# Patient Record
Sex: MALE | Race: WHITE | NOT HISPANIC OR LATINO | URBAN - METROPOLITAN AREA
[De-identification: names, ages, dates, MRNs, and addresses within clinical notes are randomized per-mention and may not be internally consistent; named-entity substitution may affect disease eponyms.]

---

## 2019-10-18 ENCOUNTER — EMERGENCY (EMERGENCY)
Facility: HOSPITAL | Age: 54
LOS: 1 days | Discharge: HOME | End: 2019-10-18
Attending: EMERGENCY MEDICINE | Admitting: EMERGENCY MEDICINE
Payer: COMMERCIAL

## 2019-10-18 VITALS
DIASTOLIC BLOOD PRESSURE: 70 MMHG | OXYGEN SATURATION: 98 % | SYSTOLIC BLOOD PRESSURE: 106 MMHG | TEMPERATURE: 98 F | RESPIRATION RATE: 16 BRPM | HEART RATE: 69 BPM

## 2019-10-18 VITALS
OXYGEN SATURATION: 95 % | TEMPERATURE: 98 F | DIASTOLIC BLOOD PRESSURE: 84 MMHG | RESPIRATION RATE: 18 BRPM | WEIGHT: 214.95 LBS | HEART RATE: 84 BPM | SYSTOLIC BLOOD PRESSURE: 120 MMHG

## 2019-10-18 LAB
APPEARANCE UR: CLEAR — SIGNIFICANT CHANGE UP
BASOPHILS # BLD AUTO: 0.09 K/UL — SIGNIFICANT CHANGE UP (ref 0–0.2)
BASOPHILS NFR BLD AUTO: 0.5 % — SIGNIFICANT CHANGE UP (ref 0–2)
BILIRUB UR-MCNC: NEGATIVE — SIGNIFICANT CHANGE UP
COLOR SPEC: YELLOW — SIGNIFICANT CHANGE UP
DIFF PNL FLD: ABNORMAL
EOSINOPHIL # BLD AUTO: 0.07 K/UL — SIGNIFICANT CHANGE UP (ref 0–0.5)
EOSINOPHIL NFR BLD AUTO: 0.4 % — SIGNIFICANT CHANGE UP (ref 0–6)
GLUCOSE UR QL: NEGATIVE — SIGNIFICANT CHANGE UP
HCT VFR BLD CALC: 47.6 % — SIGNIFICANT CHANGE UP (ref 39–50)
HGB BLD-MCNC: 16.2 G/DL — SIGNIFICANT CHANGE UP (ref 13–17)
IMM GRANULOCYTES NFR BLD AUTO: 0.5 % — SIGNIFICANT CHANGE UP (ref 0–1.5)
KETONES UR-MCNC: NEGATIVE — SIGNIFICANT CHANGE UP
LEUKOCYTE ESTERASE UR-ACNC: NEGATIVE — SIGNIFICANT CHANGE UP
LYMPHOCYTES # BLD AUTO: 16.2 % — SIGNIFICANT CHANGE UP (ref 13–44)
LYMPHOCYTES # BLD AUTO: 2.84 K/UL — SIGNIFICANT CHANGE UP (ref 1–3.3)
MCHC RBC-ENTMCNC: 30.7 PG — SIGNIFICANT CHANGE UP (ref 27–34)
MCHC RBC-ENTMCNC: 34 GM/DL — SIGNIFICANT CHANGE UP (ref 32–36)
MCV RBC AUTO: 90.2 FL — SIGNIFICANT CHANGE UP (ref 80–100)
MONOCYTES # BLD AUTO: 1.23 K/UL — HIGH (ref 0–0.9)
MONOCYTES NFR BLD AUTO: 7 % — SIGNIFICANT CHANGE UP (ref 2–14)
NEUTROPHILS # BLD AUTO: 13.2 K/UL — HIGH (ref 1.8–7.4)
NEUTROPHILS NFR BLD AUTO: 75.4 % — SIGNIFICANT CHANGE UP (ref 43–77)
NITRITE UR-MCNC: NEGATIVE — SIGNIFICANT CHANGE UP
NRBC # BLD: 0 /100 WBCS — SIGNIFICANT CHANGE UP (ref 0–0)
PH UR: 7 — SIGNIFICANT CHANGE UP (ref 5–8)
PLATELET # BLD AUTO: 219 K/UL — SIGNIFICANT CHANGE UP (ref 150–400)
PROT UR-MCNC: NEGATIVE — SIGNIFICANT CHANGE UP
RBC # BLD: 5.28 M/UL — SIGNIFICANT CHANGE UP (ref 4.2–5.8)
RBC # FLD: 12.7 % — SIGNIFICANT CHANGE UP (ref 10.3–14.5)
SP GR SPEC: 1 — LOW (ref 1.01–1.02)
UROBILINOGEN FLD QL: NEGATIVE — SIGNIFICANT CHANGE UP
WBC # BLD: 17.51 K/UL — HIGH (ref 3.8–10.5)
WBC # FLD AUTO: 17.51 K/UL — HIGH (ref 3.8–10.5)

## 2019-10-18 PROCEDURE — 99284 EMERGENCY DEPT VISIT MOD MDM: CPT | Mod: 25

## 2019-10-18 PROCEDURE — 74176 CT ABD & PELVIS W/O CONTRAST: CPT

## 2019-10-18 PROCEDURE — 99284 EMERGENCY DEPT VISIT MOD MDM: CPT

## 2019-10-18 PROCEDURE — 74176 CT ABD & PELVIS W/O CONTRAST: CPT | Mod: 26

## 2019-10-18 PROCEDURE — 96361 HYDRATE IV INFUSION ADD-ON: CPT

## 2019-10-18 PROCEDURE — 83690 ASSAY OF LIPASE: CPT

## 2019-10-18 PROCEDURE — 36415 COLL VENOUS BLD VENIPUNCTURE: CPT

## 2019-10-18 PROCEDURE — 96375 TX/PRO/DX INJ NEW DRUG ADDON: CPT

## 2019-10-18 PROCEDURE — 81001 URINALYSIS AUTO W/SCOPE: CPT

## 2019-10-18 PROCEDURE — 80053 COMPREHEN METABOLIC PANEL: CPT

## 2019-10-18 PROCEDURE — 85027 COMPLETE CBC AUTOMATED: CPT

## 2019-10-18 PROCEDURE — 96374 THER/PROPH/DIAG INJ IV PUSH: CPT

## 2019-10-18 RX ORDER — PIPERACILLIN AND TAZOBACTAM 4; .5 G/20ML; G/20ML
3.38 INJECTION, POWDER, LYOPHILIZED, FOR SOLUTION INTRAVENOUS ONCE
Refills: 0 | Status: COMPLETED | OUTPATIENT
Start: 2019-10-18 | End: 2019-10-18

## 2019-10-18 RX ORDER — SODIUM CHLORIDE 9 MG/ML
1000 INJECTION INTRAMUSCULAR; INTRAVENOUS; SUBCUTANEOUS ONCE
Refills: 0 | Status: COMPLETED | OUTPATIENT
Start: 2019-10-18 | End: 2019-10-18

## 2019-10-18 RX ORDER — KETOROLAC TROMETHAMINE 30 MG/ML
30 SYRINGE (ML) INJECTION ONCE
Refills: 0 | Status: DISCONTINUED | OUTPATIENT
Start: 2019-10-18 | End: 2019-10-18

## 2019-10-18 RX ORDER — METRONIDAZOLE 500 MG
500 TABLET ORAL ONCE
Refills: 0 | Status: COMPLETED | OUTPATIENT
Start: 2019-10-18 | End: 2019-10-18

## 2019-10-18 RX ORDER — METRONIDAZOLE 500 MG
1 TABLET ORAL
Qty: 30 | Refills: 0
Start: 2019-10-18 | End: 2019-10-27

## 2019-10-18 RX ADMIN — SODIUM CHLORIDE 1000 MILLILITER(S): 9 INJECTION INTRAMUSCULAR; INTRAVENOUS; SUBCUTANEOUS at 10:58

## 2019-10-18 RX ADMIN — SODIUM CHLORIDE 1000 MILLILITER(S): 9 INJECTION INTRAMUSCULAR; INTRAVENOUS; SUBCUTANEOUS at 09:58

## 2019-10-18 RX ADMIN — Medication 30 MILLIGRAM(S): at 10:08

## 2019-10-18 RX ADMIN — Medication 100 MILLIGRAM(S): at 13:09

## 2019-10-18 RX ADMIN — PIPERACILLIN AND TAZOBACTAM 3.38 GRAM(S): 4; .5 INJECTION, POWDER, LYOPHILIZED, FOR SOLUTION INTRAVENOUS at 09:47

## 2019-10-18 RX ADMIN — Medication 30 MILLIGRAM(S): at 09:58

## 2019-10-18 RX ADMIN — PIPERACILLIN AND TAZOBACTAM 200 GRAM(S): 4; .5 INJECTION, POWDER, LYOPHILIZED, FOR SOLUTION INTRAVENOUS at 12:17

## 2019-10-18 NOTE — ED PROVIDER NOTE - CARE PLAN
Principal Discharge DX:	Flank pain, acute Principal Discharge DX:	Diverticulitis of large intestine without perforation or abscess without bleeding

## 2019-10-18 NOTE — ED PROVIDER NOTE - CARE PROVIDER_API CALL
Diomedes Espinal ()  Internal Medicine  [address] [place] [address]  Phone: [phone]  Fax: [phone]  Follow Up Time:

## 2019-10-18 NOTE — ED ADULT NURSE NOTE - OBJECTIVE STATEMENT
Received pt in bed alert and oriented x4.  C/O left flank pain since yesterday.  Oly placed and blood work sent to lab.  Pt denies n/v/d.  Ongoing nursing care and safety maintained. Received pt in bed alert and oriented x4.  C/O left flank pain since yesterday.  Oly placed and blood work sent to lab.  Pt denies n/v/d.  Ct sent for CT.   Ongoing nursing care and safety maintained.

## 2019-10-18 NOTE — ED PROVIDER NOTE - OBJECTIVE STATEMENT
55 yo male hx of IBS c/o b/l flank pain that started yesterday with some associated chills, but now having pain only on left flank, nonradiating, spasm-like, no medication taken, moderate to severe, now subsiding.  No nausea/vomiting/diarrhea.   No fever.

## 2019-10-18 NOTE — ED PROVIDER NOTE - PATIENT PORTAL LINK FT
You can access the FollowMyHealth Patient Portal offered by [facility] by registering at the following website: [email or web address]. By joining [employer]’s FollowMyHealth portal, you will also be able to view your health information using other applications (apps) compatible with our system.

## 2019-10-18 NOTE — ED PROVIDER NOTE - NSFOLLOWUPINSTRUCTIONS_ED_ALL_ED_FT
1) Follow-up with your gastroenterologist or Dr. Espinal. Call today / next business day for prompt follow-up.  2) Return to Emergency room for any worsening or persistent pain, weakness, fever, or any other concerning symptoms.  3) See attached instruction sheets for additional information, including information regarding signs and symptoms to look out for, reasons to seek immediate care and other important instructions.  4) Augmentin 875mg every 12 hours for 10 days.  5) Flagyl 500mg every 8 hours for 10 days.

## 2019-10-18 NOTE — ED PROVIDER NOTE - NS ED ROS FT
Constitutional: - Fever, + Chills, - Anorexia, - Fatigue, - Night sweats  Eyes: - Discharge, - Irritation, - Redness, - Visual changes, - Light sensitivity, - Pain  EARS: - Ear Pain, - Tinnitus, - Decreased hearing  NOSE: - Congestion, - Bloody nose  MOUTH/THROAT: - Vocal Changes, - Drooling, - Sore throat  NECK: - Lumps, - Stiffness, - Pain  CV: - Palpitations, - Chest Pain, - Edema, - Syncope  RESP:  - Cough, - Shortness of Breath, - Dyspnea on Exertion, - Trouble speaking, - Pleuritic pain - Wheezing  GI: - Diarrhea, - Constipation, - Bloody stools, - Nausea, - Vomiting, - Abdominal Pain  : - Dysuria, -Frequency, - Hematuria, - Hesitancy, - Incontinence, - Saddle Anesthesia, - Abnormal discharge  MSK: - Myalgias, - Arthralgias, - Weakness, - Deformities, +left flank pain  SKIN: - Color change, - Rash, - Swelling, - Ecchymosis, - Abrasion, - laceration  NEURO: - Change in behavior, - Dec. Alertness, - Headache, - Dizziness, - Change in speech, - Weakness, - Seizure-like activity, - Difficulty ambulating

## 2019-10-18 NOTE — ED PROVIDER NOTE - PROGRESS NOTE DETAILS
labs and imaging explained, feels better, tolerating PO, will f/u with outpatient GI, offered admission, but patient declined, wants to try oral abx

## 2020-10-16 PROBLEM — K58.9 IRRITABLE BOWEL SYNDROME WITHOUT DIARRHEA: Chronic | Status: ACTIVE | Noted: 2019-10-18

## 2020-10-16 PROBLEM — Z00.00 ENCOUNTER FOR PREVENTIVE HEALTH EXAMINATION: Status: ACTIVE | Noted: 2020-10-16

## 2020-10-19 ENCOUNTER — RECORD ABSTRACTING (OUTPATIENT)
Age: 55
End: 2020-10-19

## 2020-10-19 ENCOUNTER — APPOINTMENT (OUTPATIENT)
Dept: OTOLARYNGOLOGY | Facility: CLINIC | Age: 55
End: 2020-10-19
Payer: COMMERCIAL

## 2020-10-19 VITALS
HEIGHT: 72 IN | DIASTOLIC BLOOD PRESSURE: 87 MMHG | WEIGHT: 215 LBS | SYSTOLIC BLOOD PRESSURE: 147 MMHG | TEMPERATURE: 97.1 F | BODY MASS INDEX: 29.12 KG/M2

## 2020-10-19 DIAGNOSIS — Z87.09 PERSONAL HISTORY OF OTHER DISEASES OF THE RESPIRATORY SYSTEM: ICD-10-CM

## 2020-10-19 DIAGNOSIS — Z78.9 OTHER SPECIFIED HEALTH STATUS: ICD-10-CM

## 2020-10-19 DIAGNOSIS — R22.0 LOCALIZED SWELLING, MASS AND LUMP, HEAD: ICD-10-CM

## 2020-10-19 DIAGNOSIS — H60.8X9 OTHER OTITIS EXTERNA, UNSPECIFIED EAR: ICD-10-CM

## 2020-10-19 DIAGNOSIS — R22.1 LOCALIZED SWELLING, MASS AND LUMP, HEAD: ICD-10-CM

## 2020-10-19 DIAGNOSIS — Z86.69 PERSONAL HISTORY OF OTHER DISEASES OF THE NERVOUS SYSTEM AND SENSE ORGANS: ICD-10-CM

## 2020-10-19 PROCEDURE — 99213 OFFICE O/P EST LOW 20 MIN: CPT | Mod: 25

## 2020-10-19 PROCEDURE — 99072 ADDL SUPL MATRL&STAF TM PHE: CPT

## 2020-10-19 PROCEDURE — 69210 REMOVE IMPACTED EAR WAX UNI: CPT

## 2020-10-19 NOTE — PHYSICAL EXAM
[de-identified] : maciel skin, CI AU [Normal] : lingual tonsils are normal [Midline] : trachea located in midline position

## 2020-10-19 NOTE — REVIEW OF SYSTEMS
[Ear Itch] : ear itch [Ear Pain] : ear pain [As Noted in HPI] : as noted in HPI [Negative] : Head and Neck

## 2020-10-19 NOTE — HISTORY OF PRESENT ILLNESS
[de-identified] : 55 yr old male c/o clogged ears. +hx CI\par -otalgia, tinnitus, dizzy\par Denies Qtips\par -hx otitis, noise exp\par +FH fatther NIHL\par +head trauma (soccer, no documented LOC)

## 2021-04-02 ENCOUNTER — APPOINTMENT (OUTPATIENT)
Dept: OTOLARYNGOLOGY | Facility: CLINIC | Age: 56
End: 2021-04-02
Payer: COMMERCIAL

## 2021-04-02 VITALS
SYSTOLIC BLOOD PRESSURE: 134 MMHG | WEIGHT: 220 LBS | HEART RATE: 99 BPM | TEMPERATURE: 97.4 F | HEIGHT: 72 IN | DIASTOLIC BLOOD PRESSURE: 87 MMHG | BODY MASS INDEX: 29.8 KG/M2

## 2021-04-02 DIAGNOSIS — H60.393 OTHER INFECTIVE OTITIS EXTERNA, BILATERAL: ICD-10-CM

## 2021-04-02 DIAGNOSIS — H61.22 IMPACTED CERUMEN, LEFT EAR: ICD-10-CM

## 2021-04-02 PROCEDURE — 99072 ADDL SUPL MATRL&STAF TM PHE: CPT

## 2021-04-02 PROCEDURE — 99213 OFFICE O/P EST LOW 20 MIN: CPT | Mod: 25

## 2021-04-02 PROCEDURE — 69210 REMOVE IMPACTED EAR WAX UNI: CPT

## 2021-04-02 RX ORDER — USTEKINUMAB 90 MG/ML
90 INJECTION, SOLUTION SUBCUTANEOUS
Refills: 0 | Status: ACTIVE | COMMUNITY

## 2021-04-02 NOTE — ASSESSMENT
[FreeTextEntry1] : Reviewed and reconciled medications, allergies, PMHx, PSHx, SocHx, FMHx.\par \par h/o chronic bilateral OE\par left cerumen impaction\par \par Plan:\par Cerumen removed. FU 4 months.

## 2021-04-02 NOTE — HISTORY OF PRESENT ILLNESS
[de-identified] : The patient presents with h/o chronic bilateral OE. Pt presents today with ear clogging.

## 2021-04-02 NOTE — ADDENDUM
[FreeTextEntry1] : Documented by Moira Ling acting as scribe for Dr. Grande on 04/02/2021.\par \par All Medical record entries made by the Scribe were at my, Dr. Grande, direction and personally dictated by me on 04/02/2021 . I have reviewed the chart and agree that the record accurately reflects my personal performance of the history, physical exam, assessment and plan. I have also personally directed, reviewed, and agreed with the discharge instructions.

## 2021-04-02 NOTE — PHYSICAL EXAM
[Hearing Kothari Test (Tuning Fork On Forehead)] : no lateralization of tone [] : septum deviated to the right [Midline] : trachea located in midline position [Normal] : no rashes [FreeTextEntry8] : cerumen removed via suction and curettage [FreeTextEntry9] : cerumen impaction removed via suction and curettage [FreeTextEntry2] : sinuses nontender to percussion

## 2021-09-30 ENCOUNTER — APPOINTMENT (OUTPATIENT)
Dept: OTOLARYNGOLOGY | Facility: CLINIC | Age: 56
End: 2021-09-30
Payer: COMMERCIAL

## 2021-09-30 VITALS
SYSTOLIC BLOOD PRESSURE: 128 MMHG | HEART RATE: 71 BPM | BODY MASS INDEX: 30.34 KG/M2 | WEIGHT: 224 LBS | HEIGHT: 72 IN | DIASTOLIC BLOOD PRESSURE: 84 MMHG

## 2021-09-30 PROCEDURE — 69210 REMOVE IMPACTED EAR WAX UNI: CPT

## 2021-09-30 PROCEDURE — 99213 OFFICE O/P EST LOW 20 MIN: CPT | Mod: 25

## 2021-09-30 NOTE — REVIEW OF SYSTEMS
[As Noted in HPI] : as noted in HPI [Ear Pain] : ear pain [Ear Itch] : ear itch [Negative] : Head and Neck

## 2021-09-30 NOTE — PHYSICAL EXAM
[de-identified] : dry skin w CI AU [] : septum deviated to the right [Normal] : mucosa is normal [Midline] : trachea located in midline position

## 2021-09-30 NOTE — HISTORY OF PRESENT ILLNESS
[de-identified] : 56 yr old male c/o clogged ears. +hx CI\par -otalgia, tinnitus, dizzy\par Denies Qtips\par -hx otitis, noise exp\par +FH fatther NIHL\par +head trauma (soccer, no documented LOC)

## 2022-03-17 ENCOUNTER — APPOINTMENT (OUTPATIENT)
Dept: OTOLARYNGOLOGY | Facility: CLINIC | Age: 57
End: 2022-03-17
Payer: COMMERCIAL

## 2022-03-17 VITALS
SYSTOLIC BLOOD PRESSURE: 101 MMHG | DIASTOLIC BLOOD PRESSURE: 67 MMHG | HEART RATE: 90 BPM | WEIGHT: 220 LBS | HEIGHT: 72 IN | BODY MASS INDEX: 29.8 KG/M2

## 2022-03-17 PROCEDURE — 99213 OFFICE O/P EST LOW 20 MIN: CPT | Mod: 25

## 2022-03-17 PROCEDURE — 69210 REMOVE IMPACTED EAR WAX UNI: CPT

## 2022-03-17 NOTE — PHYSICAL EXAM
[de-identified] :  CI AU [] : septum deviated to the right [Normal] : mucosa is normal [Midline] : trachea located in midline position

## 2022-03-21 ENCOUNTER — EMERGENCY (EMERGENCY)
Facility: HOSPITAL | Age: 57
LOS: 1 days | Discharge: HOME | End: 2022-03-21
Attending: EMERGENCY MEDICINE | Admitting: EMERGENCY MEDICINE
Payer: COMMERCIAL

## 2022-03-21 VITALS
SYSTOLIC BLOOD PRESSURE: 125 MMHG | WEIGHT: 220.02 LBS | RESPIRATION RATE: 18 BRPM | TEMPERATURE: 98 F | HEIGHT: 72 IN | HEART RATE: 77 BPM | DIASTOLIC BLOOD PRESSURE: 85 MMHG | OXYGEN SATURATION: 98 %

## 2022-03-21 VITALS
HEART RATE: 63 BPM | DIASTOLIC BLOOD PRESSURE: 79 MMHG | OXYGEN SATURATION: 97 % | RESPIRATION RATE: 16 BRPM | SYSTOLIC BLOOD PRESSURE: 123 MMHG | TEMPERATURE: 98 F

## 2022-03-21 LAB
ALBUMIN SERPL ELPH-MCNC: 4 G/DL — SIGNIFICANT CHANGE UP (ref 3.3–5)
ALP SERPL-CCNC: 91 U/L — SIGNIFICANT CHANGE UP (ref 40–120)
ALT FLD-CCNC: 49 U/L — SIGNIFICANT CHANGE UP (ref 12–78)
ANION GAP SERPL CALC-SCNC: 5 MMOL/L — SIGNIFICANT CHANGE UP (ref 5–17)
APPEARANCE UR: CLEAR — SIGNIFICANT CHANGE UP
AST SERPL-CCNC: 24 U/L — SIGNIFICANT CHANGE UP (ref 15–37)
BASOPHILS # BLD AUTO: 0.09 K/UL — SIGNIFICANT CHANGE UP (ref 0–0.2)
BASOPHILS NFR BLD AUTO: 0.6 % — SIGNIFICANT CHANGE UP (ref 0–2)
BILIRUB SERPL-MCNC: 0.7 MG/DL — SIGNIFICANT CHANGE UP (ref 0.2–1.2)
BILIRUB UR-MCNC: NEGATIVE — SIGNIFICANT CHANGE UP
BUN SERPL-MCNC: 14 MG/DL — SIGNIFICANT CHANGE UP (ref 7–23)
CALCIUM SERPL-MCNC: 9.6 MG/DL — SIGNIFICANT CHANGE UP (ref 8.5–10.1)
CHLORIDE SERPL-SCNC: 106 MMOL/L — SIGNIFICANT CHANGE UP (ref 96–108)
CO2 SERPL-SCNC: 27 MMOL/L — SIGNIFICANT CHANGE UP (ref 22–31)
COLOR SPEC: SIGNIFICANT CHANGE UP
CREAT SERPL-MCNC: 0.93 MG/DL — SIGNIFICANT CHANGE UP (ref 0.5–1.3)
DIFF PNL FLD: NEGATIVE — SIGNIFICANT CHANGE UP
EGFR: 96 ML/MIN/1.73M2 — SIGNIFICANT CHANGE UP
EOSINOPHIL # BLD AUTO: 0.18 K/UL — SIGNIFICANT CHANGE UP (ref 0–0.5)
EOSINOPHIL NFR BLD AUTO: 1.3 % — SIGNIFICANT CHANGE UP (ref 0–6)
GLUCOSE SERPL-MCNC: 110 MG/DL — HIGH (ref 70–99)
GLUCOSE UR QL: NEGATIVE — SIGNIFICANT CHANGE UP
HCT VFR BLD CALC: 46.6 % — SIGNIFICANT CHANGE UP (ref 39–50)
HGB BLD-MCNC: 16.1 G/DL — SIGNIFICANT CHANGE UP (ref 13–17)
IMM GRANULOCYTES NFR BLD AUTO: 0.8 % — SIGNIFICANT CHANGE UP (ref 0–1.5)
KETONES UR-MCNC: NEGATIVE — SIGNIFICANT CHANGE UP
LEUKOCYTE ESTERASE UR-ACNC: NEGATIVE — SIGNIFICANT CHANGE UP
LIDOCAIN IGE QN: 88 U/L — SIGNIFICANT CHANGE UP (ref 73–393)
LYMPHOCYTES # BLD AUTO: 26.5 % — SIGNIFICANT CHANGE UP (ref 13–44)
LYMPHOCYTES # BLD AUTO: 3.76 K/UL — HIGH (ref 1–3.3)
MCHC RBC-ENTMCNC: 30.8 PG — SIGNIFICANT CHANGE UP (ref 27–34)
MCHC RBC-ENTMCNC: 34.5 GM/DL — SIGNIFICANT CHANGE UP (ref 32–36)
MCV RBC AUTO: 89.3 FL — SIGNIFICANT CHANGE UP (ref 80–100)
MONOCYTES # BLD AUTO: 0.84 K/UL — SIGNIFICANT CHANGE UP (ref 0–0.9)
MONOCYTES NFR BLD AUTO: 5.9 % — SIGNIFICANT CHANGE UP (ref 2–14)
NEUTROPHILS # BLD AUTO: 9.22 K/UL — HIGH (ref 1.8–7.4)
NEUTROPHILS NFR BLD AUTO: 64.9 % — SIGNIFICANT CHANGE UP (ref 43–77)
NITRITE UR-MCNC: NEGATIVE — SIGNIFICANT CHANGE UP
NRBC # BLD: 0 /100 WBCS — SIGNIFICANT CHANGE UP (ref 0–0)
PH UR: 6 — SIGNIFICANT CHANGE UP (ref 5–8)
PLATELET # BLD AUTO: 227 K/UL — SIGNIFICANT CHANGE UP (ref 150–400)
POTASSIUM SERPL-MCNC: 4.3 MMOL/L — SIGNIFICANT CHANGE UP (ref 3.5–5.3)
POTASSIUM SERPL-SCNC: 4.3 MMOL/L — SIGNIFICANT CHANGE UP (ref 3.5–5.3)
PROT SERPL-MCNC: 7.7 G/DL — SIGNIFICANT CHANGE UP (ref 6–8.3)
PROT UR-MCNC: NEGATIVE — SIGNIFICANT CHANGE UP
RBC # BLD: 5.22 M/UL — SIGNIFICANT CHANGE UP (ref 4.2–5.8)
RBC # FLD: 12.6 % — SIGNIFICANT CHANGE UP (ref 10.3–14.5)
SODIUM SERPL-SCNC: 138 MMOL/L — SIGNIFICANT CHANGE UP (ref 135–145)
SP GR SPEC: 1 — LOW (ref 1.01–1.02)
UROBILINOGEN FLD QL: NEGATIVE — SIGNIFICANT CHANGE UP
WBC # BLD: 14.2 K/UL — HIGH (ref 3.8–10.5)
WBC # FLD AUTO: 14.2 K/UL — HIGH (ref 3.8–10.5)

## 2022-03-21 PROCEDURE — 81003 URINALYSIS AUTO W/O SCOPE: CPT

## 2022-03-21 PROCEDURE — 83690 ASSAY OF LIPASE: CPT

## 2022-03-21 PROCEDURE — 80053 COMPREHEN METABOLIC PANEL: CPT

## 2022-03-21 PROCEDURE — 74177 CT ABD & PELVIS W/CONTRAST: CPT | Mod: MA

## 2022-03-21 PROCEDURE — 74177 CT ABD & PELVIS W/CONTRAST: CPT | Mod: 26,MA

## 2022-03-21 PROCEDURE — 36415 COLL VENOUS BLD VENIPUNCTURE: CPT

## 2022-03-21 PROCEDURE — 99284 EMERGENCY DEPT VISIT MOD MDM: CPT | Mod: 25

## 2022-03-21 PROCEDURE — 85025 COMPLETE CBC W/AUTO DIFF WBC: CPT

## 2022-03-21 PROCEDURE — 99285 EMERGENCY DEPT VISIT HI MDM: CPT

## 2022-03-21 RX ORDER — SODIUM CHLORIDE 9 MG/ML
1000 INJECTION INTRAMUSCULAR; INTRAVENOUS; SUBCUTANEOUS ONCE
Refills: 0 | Status: COMPLETED | OUTPATIENT
Start: 2022-03-21 | End: 2022-03-21

## 2022-03-21 RX ADMIN — SODIUM CHLORIDE 1000 MILLILITER(S): 9 INJECTION INTRAMUSCULAR; INTRAVENOUS; SUBCUTANEOUS at 19:53

## 2022-03-21 RX ADMIN — Medication 1 TABLET(S): at 21:27

## 2022-03-21 NOTE — ED ADULT NURSE NOTE - NSFALLRSKPASTHIST_ED_ALL_ED
Talk With Pt.'s legal Guardian (Mother) to remind them about pt's video appt on Monday at 9am.    Mother prefer link for video appt via E-mail.   no

## 2022-03-21 NOTE — ED PROVIDER NOTE - PATIENT PORTAL LINK FT
You can access the FollowMyHealth Patient Portal offered by [facility] by registering at the following website: [email or web address]. By joining [employer]’s FollowMyHealth portal, you will also be able to view your health information using other applications (apps) compatible with our system.

## 2022-03-21 NOTE — ED PROVIDER NOTE - CARE PROVIDER_API CALL
Cedrick Dias)  Gastroenterology  [facility], [address] [place] [address]  Phone: [phone]  Fax: [phone]  Follow Up Time: 1-3 Days

## 2022-03-21 NOTE — ED ADULT TRIAGE NOTE - AS HEIGHT TYPE
NEPHROLOGY / [place] / [place]    ·  As our patient we look forward to providing you with the best quality of care and         helping to facilitate your needs.    · On your way out, you may schedule your next appointment at the reception desk where you checked in at, or you can call us back at either of the locations noted below, to schedule with Dr. Rivera Buitrago, in the Nephrology Dept. (kidney doctor)    · Your next appointment with Dr. Stafford is due in:6 months    · Your NON-FASTING LABS will be due few days prior to your next office visit. These orders will be entered in the system. You may present to any [facility] lab for your tests, no appointment is necessary *Results of these tests will then be given to you at your next scheduled office visit with     · For any tests that you may need, please see: \"Non-Medication Orders\"section  __________________________________________________________________________________________    *Appointment hours for Dr. Stafford at both locations below are 12:30pm to 3:50pm.  *Please note we are out of the office on Friday, Saturday, and Sunday    [facility]    (Tues., Wed., & Thurs.)  [address] [place] [address]   Phone: [phone]   Fax: [phone]    [facility]  (Monday's only)  [address], [address]  *[place] [address]  Phone: [phone]  Fax: [phone]    Additional Educational Resources:  For additional resources regarding your symptoms, diagnosis, or further health information, please visit the Health Resources section on [email or web address] or the Online Health Resources section in [employer] [facility].      
stated

## 2022-03-21 NOTE — ED PROVIDER NOTE - NS ED ATTENDING STATEMENT MOD
This was a shared visit with the DIEUDONNE. I reviewed and verified the documentation and independently performed the documented:

## 2022-03-21 NOTE — ED PROVIDER NOTE - NSFOLLOWUPINSTRUCTIONS_ED_ALL_ED_FT
Follow up with your gastroenterologist in 1-2 days for re-evaluation, ongoing care and treatment. Rest, drink plenty of fluids, take full course of antibiotics as prescribed. If having worsening of symptoms, fever, vomiting or other related symptoms, RETURN TO THE ER IMMEDIATELY.     Diverticulitis       Diverticulitis is when small pouches in your colon (large intestine) get infected or swollen. This causes pain in the belly (abdomen) and watery poop (diarrhea).    These pouches are called diverticula. The pouches form in people who have a condition called diverticulosis.      What are the causes?    This condition may be caused by poop (stool) that gets trapped in the pouches in your colon. The poop lets germs (bacteria) grow in the pouches. This causes the infection.      What increases the risk?    You are more likely to get this condition if you have small pouches in your colon. The risk is higher if:  •You are overweight or very overweight (obese).      •You do not exercise enough.      •You drink alcohol.      •You smoke or use products with tobacco in them.      •You eat a diet that has a lot of red meat such as beef, pork, or lamb.      •You eat a diet that does not have enough fiber in it.      •You are older than 40 years of age.        What are the signs or symptoms?    •Pain in the belly. Pain is often on the left side, but it may be in other areas.      •Fever and feeling cold.      •Feeling like you may vomit.      •Vomiting.      •Having cramps.      •Feeling full.      •Changes to how often you poop.      •Blood in your poop.        How is this treated?    Most cases are treated at home by:  •Taking over-the-counter pain medicines.      •Following a clear liquid diet.      •Taking antibiotic medicines.      •Resting.      Very bad cases may need to be treated at a hospital. This may include:  •Not eating or drinking.      •Taking prescription pain medicine.      •Getting antibiotic medicines through an IV tube.      •Getting fluid and food through an IV tube.      •Having surgery.      When you are feeling better, your doctor may tell you to have a test to check your colon (colonoscopy).      Follow these instructions at home:    Medicines   •Take over-the-counter and prescription medicines only as told by your doctor. These include:  •Antibiotics.      •Pain medicines.      •Fiber pills.      •Probiotics.      •Stool softeners.        •If you were prescribed an antibiotic medicine, take it as told by your doctor. Do not stop taking the antibiotic even if you start to feel better.      •Ask your doctor if the medicine prescribed to you requires you to avoid driving or using machinery.        Eating and drinking      •Follow a diet as told by your doctor.    •When you feel better, your doctor may tell you to change your diet. You may need to eat a lot of fiber. Fiber makes it easier to poop (have a bowel movement). Foods with fiber include:  •Berries.      •Beans.      •Lentils.      •Green vegetables.        •Avoid eating red meat.      General instructions     • Do not use any products that contain nicotine or tobacco, such as cigarettes, e-cigarettes, and chewing tobacco. If you need help quitting, ask your doctor.      •Exercise 3 or more times a week. Try to get 30 minutes each time. Exercise enough to sweat and make your heart beat faster.      •Keep all follow-up visits as told by your doctor. This is important.        Contact a doctor if:    •Your pain does not get better.      •You are not pooping like normal.        Get help right away if:    •Your pain gets worse.      •Your symptoms do not get better.      •Your symptoms get worse very fast.      •You have a fever.      •You vomit more than one time.    •You have poop that is:  •Bloody.      •Black.      •Tarry.          Summary    •This condition happens when small pouches in your colon get infected or swollen.      •Take medicines only as told by your doctor.      •Follow a diet as told by your doctor.      •Keep all follow-up visits as told by your doctor. This is important.      This information is not intended to replace advice given to you by your health care provider. Make sure you discuss any questions you have with your health care provider.

## 2022-03-21 NOTE — ED PROVIDER NOTE - CLINICAL SUMMARY MEDICAL DECISION MAKING FREE TEXT BOX
55 yo M with hx diverticulitis p/w LLQ abd pain x past ~ 3 days. assoc with some worsening diarrhea, has chronic diarrhea, slight worse. No fever/chills. no cough / uri. no neck / back pain. no dysuria/ hematuria. No trauma. No covid exposures, pt vaccinated. no other acute co or changes.  Exam: MM Moist. neck supple. no meningeal signs. abd soft, pos tend LLQ, no selina / guard. no hsm. no cvat. no upper abd tend. NO other acute findings. CTA bl, nl cardiac exam.  check labs, CT, IVF, reeval

## 2022-03-21 NOTE — ED PROVIDER NOTE - OBJECTIVE STATEMENT
55 y/o M with hx of IBS, diverticulitis, B inguinal hernia repair, appendectomy presents with c/o lower abdominal pain 57 y/o M with hx of IBS, diverticulitis, B inguinal hernia repair, appendectomy presents with c/o diffuse lower abdominal pain and worsening non bloody diarrhea x 3 days. Denies N/V, fever, blood in stool, urinary symptoms. Pt declined pain meds in ED.  GI: Dr. Cedrick Dias

## 2022-03-21 NOTE — ED PROVIDER NOTE - ADDITIONAL NOTES AND INSTRUCTIONS:
Mr. Mosher was under my care in the emergency department today and cannot travel due to illness/injury.

## 2022-03-21 NOTE — ED PROVIDER NOTE - DISPOSITION TYPE
Keep up your roller skating and scootering!      Add pravastatin (cholesterol medicine) - take at night if you can    Keep up your metformin and watch the starches.  Try to sneak a couple salads    Come back for visit in about 4 months with labs prior (orders waiting for you)    Keep on same prozac dose for now.  Let me know about any mood problems.       DISCHARGE

## 2022-03-21 NOTE — ED PROVIDER NOTE - PROGRESS NOTE DETAILS
Reevaluated patient at bedside. Discussed the results of all diagnostic testing in ED and copies of all reports given. Advised will rx augmentin, f/u GI, strict return precautions. An opportunity to ask questions was given.  Discussed the importance of prompt, close medical follow-up.  Patient will return with any changes, concerns or persistent / worsening symptoms.  Understanding of all instructions verbalized.

## 2022-05-13 ENCOUNTER — APPOINTMENT (OUTPATIENT)
Dept: OTOLARYNGOLOGY | Facility: CLINIC | Age: 57
End: 2022-05-13

## 2022-07-15 ENCOUNTER — APPOINTMENT (OUTPATIENT)
Dept: OTOLARYNGOLOGY | Facility: CLINIC | Age: 57
End: 2022-07-15

## 2022-07-15 VITALS
DIASTOLIC BLOOD PRESSURE: 81 MMHG | HEART RATE: 67 BPM | BODY MASS INDEX: 29.12 KG/M2 | SYSTOLIC BLOOD PRESSURE: 118 MMHG | WEIGHT: 215 LBS | HEIGHT: 72 IN

## 2022-07-15 PROCEDURE — 99213 OFFICE O/P EST LOW 20 MIN: CPT

## 2022-07-15 NOTE — PHYSICAL EXAM
[Hearing Kothari Test (Tuning Fork On Forehead)] : no lateralization of tone [] : septum deviated to the right [Midline] : trachea located in midline position [Normal] : no masses and lesions seen, face is symmetric [FreeTextEntry8] : cerumen removed via curettage - combination of wax and hair - narrow canal  [FreeTextEntry9] : cerumen removed via curettage - combination of wax and hair - narrow canal  [de-identified] : mildly inflamed turbs  [de-identified] : mild uvula edema, bony exostosis on floor of the mouth  [de-identified] : class 1 [de-identified] : type 2 oral cavity  [FreeTextEntry2] : sinuses nontender to percussion sensations intact

## 2022-07-15 NOTE — HISTORY OF PRESENT ILLNESS
[de-identified] : The patient presents with h/o chronic bilateral OE. The patient presents today for cerumen removal. Pt reports feeling little clogged sensation in the ears.

## 2022-07-15 NOTE — ADDENDUM
[FreeTextEntry1] : Documented by Tao Floyd acting as scribe for Dr. Grande on 07/15/2022. \par \par All Medical record entries made by the scribe were at my. Dr. Grande direction and personally dictated by me on 07/15/2022. I have reviewed the chart and agree that the record accurately reflects my personal performance of the history, physical exam, assessment and plan. I have also personally directed, reviewed, and agreed with the discharge instructions.

## 2022-07-15 NOTE — ASSESSMENT
[FreeTextEntry1] : Reviewed and reconciled medications, allergies, PMHx, PSHx, SocHx, FMHx. \par \par h/o chronic bilateral OE.\par The patient presents today for cerumen removal \par \par Physical exam - \par right ear: cerumen removed via curettage - combination of wax and hair - narrow canal \par left ear: cerumen removed via curettage - combination of wax and hair - narrow canal \par deviated septum to the right, mildly inflamed turbs \par mild uvula edema, bony exostosis on floor of the mouth, tonsils class 1, type 2 oral cavity \par \par Plan:\par cerumen removed. FU 3-4 months

## 2022-11-04 ENCOUNTER — APPOINTMENT (OUTPATIENT)
Dept: OTOLARYNGOLOGY | Facility: CLINIC | Age: 57
End: 2022-11-04

## 2022-11-04 VITALS
DIASTOLIC BLOOD PRESSURE: 90 MMHG | WEIGHT: 225 LBS | HEIGHT: 72 IN | BODY MASS INDEX: 30.48 KG/M2 | SYSTOLIC BLOOD PRESSURE: 133 MMHG | HEART RATE: 71 BPM

## 2022-11-04 PROCEDURE — 99213 OFFICE O/P EST LOW 20 MIN: CPT

## 2022-11-04 NOTE — ASSESSMENT
[FreeTextEntry1] : Reviewed and reconciled medications, allergies, PMHx, PSHx, SocHx, FMHx. \par \par h/o chronic bilateral OE.\par The patient presents today for 4 months follow up\par \par Physical exam - \par cerumen removed via curettage \par deviated septum to the right \par \par Plan:\par cerumen removed b/l. FU 3 months

## 2022-11-04 NOTE — HISTORY OF PRESENT ILLNESS
[de-identified] : The patient presents with h/o chronic bilateral OE. The patient presents today for 4 months follow up.

## 2022-11-04 NOTE — ADDENDUM
[FreeTextEntry1] : Documented by Tao Floyd acting as scribe for Dr. Grande on 11/04/2022. \par \par All Medical record entries made by the scribe were at my. Dr. Grande direction and personally dictated by me on 11/04/2022. I have reviewed the chart and agree that the record accurately reflects my personal performance of the history, physical exam, assessment and plan. I have also personally directed, reviewed, and agreed with the discharge instructions.

## 2022-11-04 NOTE — PHYSICAL EXAM
[Hearing Kothari Test (Tuning Fork On Forehead)] : no lateralization of tone [] : septum deviated to the right [Midline] : trachea located in midline position [Normal] : no masses and lesions seen, face is symmetric [FreeTextEntry8] : cerumen removed via curettage  [FreeTextEntry9] : cerumen removed via curettage  [de-identified] : bony exostosis floor of the mouth  [de-identified] : class 1 [FreeTextEntry2] : sinuses nontender to percussion

## 2023-03-07 NOTE — HISTORY OF PRESENT ILLNESS
[de-identified] : 56 yr old male c/o clogged ears. +hx CI\par -otalgia, tinnitus, dizzy\par Denies Qtips\par -hx otitis, noise exp\par +FH father NIHL\par +head trauma (soccer, no documented LOC) Vascular access device

## 2023-05-04 ENCOUNTER — APPOINTMENT (OUTPATIENT)
Dept: OTOLARYNGOLOGY | Facility: CLINIC | Age: 58
End: 2023-05-04
Payer: COMMERCIAL

## 2023-05-04 VITALS
BODY MASS INDEX: 30.48 KG/M2 | HEIGHT: 72 IN | SYSTOLIC BLOOD PRESSURE: 121 MMHG | WEIGHT: 225 LBS | DIASTOLIC BLOOD PRESSURE: 83 MMHG | HEART RATE: 69 BPM

## 2023-05-04 PROCEDURE — 69210 REMOVE IMPACTED EAR WAX UNI: CPT

## 2023-05-04 PROCEDURE — 99213 OFFICE O/P EST LOW 20 MIN: CPT | Mod: 25

## 2023-05-04 RX ORDER — MOMETASONE FUROATE 1 MG/G
0.1 CREAM TOPICAL TWICE DAILY
Qty: 1 | Refills: 3 | Status: ACTIVE | COMMUNITY
Start: 2023-05-04 | End: 1900-01-01

## 2023-05-04 NOTE — PHYSICAL EXAM
[] : septum deviated to the right [Normal] : mucosa is normal [Midline] : trachea located in midline position [de-identified] :  CI AU w dry skin

## 2023-05-04 NOTE — HISTORY OF PRESENT ILLNESS
Patient presents for suture removal. 5 sutures noted to left lower cheek/chin area.  Laceration well approximated, no signs of infection.   
[de-identified] : 57 yr old male c/o clogged ears. +hx CI and OE\par -otalgia, tinnitus, dizzy\par Denies Qtips\par -hx otitis, noise exp\par +FH father NIHL\par +head trauma (soccer, no documented LOC)

## 2023-08-21 ENCOUNTER — APPOINTMENT (OUTPATIENT)
Dept: OTOLARYNGOLOGY | Facility: CLINIC | Age: 58
End: 2023-08-21
Payer: COMMERCIAL

## 2023-08-21 VITALS
HEART RATE: 71 BPM | WEIGHT: 230 LBS | DIASTOLIC BLOOD PRESSURE: 87 MMHG | SYSTOLIC BLOOD PRESSURE: 143 MMHG | HEIGHT: 72 IN | BODY MASS INDEX: 31.15 KG/M2

## 2023-08-21 PROCEDURE — 99213 OFFICE O/P EST LOW 20 MIN: CPT

## 2023-08-21 NOTE — PHYSICAL EXAM
[Hearing Loss Right Only] : normal [Hearing Loss Left Only] : normal [FreeTextEntry8] : cerumen removed via curettage [FreeTextEntry9] : cerumen removed via curettage [] : septum deviated to the right [de-identified] : inflamed turbinates [Midline] : trachea located in midline position [Normal] : no rashes

## 2023-08-21 NOTE — ASSESSMENT
[FreeTextEntry1] : Reviewed and reconciled medications, allergies, PMHx, PSHx, SocHx, FMHx.  physical exam: right: cerumen removed via curettage Left: cerumen removed via curettage deviated septum inflamed turbinate's   Plan: Follow up in 3-4 months   Case discussed with Dr. Grande

## 2023-08-21 NOTE — HISTORY OF PRESENT ILLNESS
[de-identified] : The patient presents with h/o chronic bilateral OE. The patient presents today for 3 month follow up. Patient states he is about to go on vacation and wants his ears evaluated prior to his trip.

## 2023-11-01 ENCOUNTER — APPOINTMENT (OUTPATIENT)
Dept: OTOLARYNGOLOGY | Facility: CLINIC | Age: 58
End: 2023-11-01
Payer: COMMERCIAL

## 2023-11-01 VITALS
HEIGHT: 72 IN | DIASTOLIC BLOOD PRESSURE: 79 MMHG | BODY MASS INDEX: 29.8 KG/M2 | SYSTOLIC BLOOD PRESSURE: 122 MMHG | WEIGHT: 220 LBS | HEART RATE: 78 BPM

## 2023-11-01 PROCEDURE — 99213 OFFICE O/P EST LOW 20 MIN: CPT

## 2024-04-08 ENCOUNTER — APPOINTMENT (OUTPATIENT)
Dept: OTOLARYNGOLOGY | Facility: CLINIC | Age: 59
End: 2024-04-08
Payer: COMMERCIAL

## 2024-04-08 VITALS
HEIGHT: 72 IN | WEIGHT: 215 LBS | HEART RATE: 79 BPM | BODY MASS INDEX: 29.12 KG/M2 | SYSTOLIC BLOOD PRESSURE: 157 MMHG | DIASTOLIC BLOOD PRESSURE: 95 MMHG

## 2024-04-08 DIAGNOSIS — H61.23 IMPACTED CERUMEN, BILATERAL: ICD-10-CM

## 2024-04-08 PROCEDURE — 99213 OFFICE O/P EST LOW 20 MIN: CPT | Mod: 25

## 2024-04-08 PROCEDURE — G0268 REMOVAL OF IMPACTED WAX MD: CPT

## 2024-04-08 NOTE — HISTORY OF PRESENT ILLNESS
[de-identified] :  The patient presents with h/o chronic bilateral OE. The patient presents today for 3 month follow up. Patient states he is about to go on vacation and wants his ears evaluated prior to his trip. States 1 year ago had cerumen impaction while on vacation and needed to go to emergency room because he couldnt hear while in [place]  [FreeTextEntry1] : 4/8/24  Patient presents for clogged ear sensation, decreased hearing both ears for the last couple weeks. PT denies otalgia, otorrhea. He denies any recent vertigo. He denies fevers, chills. He denies history of recurrent ear infections.  manual removal/intact

## 2024-04-08 NOTE — ASSESSMENT
[FreeTextEntry1] : Reviewed and reconciled medications, allergies, PMHx, PSHx, SocHx, FMHx.     physical exam: right ear: cerumen impaction removed  left ear: cerumen impaction removed  Inflamed turbinates deviated septum   Procedure Detail: After discussion of risks and benefits, verbal consent was obtained. Using wax curette, suction cerumen impaction was removed from the bilateral external auditory canals. The tympanic membranes were visualized and revealed bilateral intact tympanic membranes with no effusion. The patient tolerated the procedure well. Patient notes resolution of otologic symptoms and that hearing is at baseline. Audiogram was deferred.     Plan: Is going on vacation Mid May so advised to come couple days before vacation for ear evaluation Follow up in 6- 12 months     Case discussed with Dr. Grande

## 2024-04-08 NOTE — PHYSICAL EXAM
[Hearing Loss Right Only] : normal [Hearing Loss Left Only] : normal [FreeTextEntry8] : cerumen impaction removed via curettage [FreeTextEntry9] : cerumen impaction removed via curettage [Normal] : no rashes

## 2024-05-05 ENCOUNTER — NON-APPOINTMENT (OUTPATIENT)
Age: 59
End: 2024-05-05

## 2024-05-06 ENCOUNTER — APPOINTMENT (OUTPATIENT)
Dept: OTOLARYNGOLOGY | Facility: CLINIC | Age: 59
End: 2024-05-06
Payer: COMMERCIAL

## 2024-05-06 VITALS
SYSTOLIC BLOOD PRESSURE: 134 MMHG | WEIGHT: 210 LBS | DIASTOLIC BLOOD PRESSURE: 75 MMHG | HEART RATE: 76 BPM | HEIGHT: 72 IN | BODY MASS INDEX: 28.44 KG/M2

## 2024-05-06 DIAGNOSIS — H93.293 OTHER ABNORMAL AUDITORY PERCEPTIONS, BILATERAL: ICD-10-CM

## 2024-05-06 DIAGNOSIS — H93.8X3 OTHER SPECIFIED DISORDERS OF EAR, BILATERAL: ICD-10-CM

## 2024-05-06 DIAGNOSIS — H60.60 UNSPECIFIED CHRONIC OTITIS EXTERNA, UNSPECIFIED EAR: ICD-10-CM

## 2024-05-06 DIAGNOSIS — J34.2 DEVIATED NASAL SEPTUM: ICD-10-CM

## 2024-05-06 PROCEDURE — 99213 OFFICE O/P EST LOW 20 MIN: CPT

## 2024-05-06 NOTE — ASSESSMENT
[FreeTextEntry1] : Reviewed and reconciled medications, allergies, PMHx, PSHx, SocHx, FMHx.     physical exam: right ear: cerumen removed  left ear: cerumen  removed  Inflamed turbinates deviated septum   Procedure Detail: After discussion of risks and benefits, verbal consent was obtained. Using wax curette, suction cerumen was removed from the bilateral external auditory canals. The tympanic membranes were visualized and revealed bilateral intact tympanic membranes with no effusion. The patient tolerated the procedure well. Patient notes resolution of otologic symptoms and that hearing is at baseline. Audiogram was deferred.     Plan: Follow up in 3- 6 months     Case discussed with Dr. Grande

## 2024-05-06 NOTE — HISTORY OF PRESENT ILLNESS
[de-identified] :  The patient presents with h/o chronic bilateral OE. The patient presents today for 3 month follow up. Patient states he is about to go on vacation and wants his ears evaluated prior to his trip. States 1 year ago had cerumen impaction while on vacation and needed to go to emergency room because he couldnt hear while in [place]    Interval History: 4/8/24 Patient presents for clogged ear sensation, decreased hearing both ears for the last couple weeks. PT denies otalgia, otorrhea. He denies any recent vertigo. He denies fevers, chills. He denies history of recurrent ear infections. [FreeTextEntry1] : 5/6/24: Patient presents couple days prior to vacation to Dr. Roberts to make sure ears evaluated prior to trip. No otalgia, ottorhea, dizziness fever or chills.

## 2024-05-06 NOTE — PHYSICAL EXAM
[Hearing Loss Right Only] : normal [Hearing Loss Left Only] : normal [FreeTextEntry9] : minimal cerumen removed via curettage [FreeTextEntry8] : minimal cerumen removed via curettage [Normal] : no rashes

## 2024-08-23 ENCOUNTER — APPOINTMENT (OUTPATIENT)
Dept: OTOLARYNGOLOGY | Facility: CLINIC | Age: 59
End: 2024-08-23
Payer: COMMERCIAL

## 2024-08-23 VITALS
HEIGHT: 72 IN | SYSTOLIC BLOOD PRESSURE: 157 MMHG | DIASTOLIC BLOOD PRESSURE: 96 MMHG | BODY MASS INDEX: 29.12 KG/M2 | HEART RATE: 71 BPM | WEIGHT: 215 LBS

## 2024-08-23 DIAGNOSIS — H60.60 UNSPECIFIED CHRONIC OTITIS EXTERNA, UNSPECIFIED EAR: ICD-10-CM

## 2024-08-23 DIAGNOSIS — H93.8X3 OTHER SPECIFIED DISORDERS OF EAR, BILATERAL: ICD-10-CM

## 2024-08-23 DIAGNOSIS — J34.2 DEVIATED NASAL SEPTUM: ICD-10-CM

## 2024-08-23 DIAGNOSIS — H93.293 OTHER ABNORMAL AUDITORY PERCEPTIONS, BILATERAL: ICD-10-CM

## 2024-08-23 DIAGNOSIS — H61.21 IMPACTED CERUMEN, RIGHT EAR: ICD-10-CM

## 2024-08-23 PROCEDURE — 69210 REMOVE IMPACTED EAR WAX UNI: CPT

## 2024-08-23 PROCEDURE — 99213 OFFICE O/P EST LOW 20 MIN: CPT | Mod: 25

## 2024-08-23 NOTE — HISTORY OF PRESENT ILLNESS
[de-identified] :  The patient presents with h/o chronic bilateral OE. The patient presents today for 3 month follow up. Patient states he is about to go on vacation and wants his ears evaluated prior to his trip. States 1 year ago had cerumen impaction while on vacation and needed to go to emergency room because he couldnt hear while in [place]  Interval History: 4/8/24 Patient presents for clogged ear sensation, decreased hearing both ears for the last couple weeks. PT denies otalgia, otorrhea. He denies any recent vertigo. He denies fevers, chills. He denies history of recurrent ear infections.   Interval History: 5/6/24: Patient presents couple days prior to vacation to Dr. Roberts to make sure ears evaluated prior to trip. No otalgia, ottorhea, dizziness fever or chills.  [FreeTextEntry1] : 8/23/24:Patient presents day prior to vacation to [place]. Wants to make sure ears evaluated prior to trip. No otalgia, ottorhea, dizziness fever or chills.

## 2024-08-23 NOTE — ASSESSMENT
[FreeTextEntry1] : Reviewed and reconciled medications, allergies, PMHx, PSHx, SocHx, FMHx.     physical exam: right ear: cerumen impaction removed  left ear: cerumen  removed  Inflamed turbinates deviated septum   Procedure Detail: After discussion of risks and benefits, verbal consent was obtained. Using wax curette, suction cerumen was removed from the bilateral external auditory canals. The tympanic membranes were visualized and revealed bilateral intact tympanic membranes with no effusion. The patient tolerated the procedure well. Patient notes resolution of otologic symptoms and that hearing is at baseline. Audiogram was deferred.     Plan: Follow up in 3- 6 months     Case discussed with Dr. Grande

## 2024-08-23 NOTE — PHYSICAL EXAM
[Hearing Loss Right Only] : normal [Hearing Loss Left Only] : normal [FreeTextEntry8] : cerumen impaction removed via curettage [FreeTextEntry9] : cerumen removed via curettage [Normal] : no rashes

## 2024-11-11 ENCOUNTER — APPOINTMENT (OUTPATIENT)
Dept: OTOLARYNGOLOGY | Facility: CLINIC | Age: 59
End: 2024-11-11
Payer: COMMERCIAL

## 2024-11-11 VITALS
SYSTOLIC BLOOD PRESSURE: 135 MMHG | BODY MASS INDEX: 29.12 KG/M2 | HEART RATE: 67 BPM | WEIGHT: 215 LBS | HEIGHT: 72 IN | DIASTOLIC BLOOD PRESSURE: 88 MMHG

## 2024-11-11 DIAGNOSIS — H93.293 OTHER ABNORMAL AUDITORY PERCEPTIONS, BILATERAL: ICD-10-CM

## 2024-11-11 DIAGNOSIS — H60.60 UNSPECIFIED CHRONIC OTITIS EXTERNA, UNSPECIFIED EAR: ICD-10-CM

## 2024-11-11 DIAGNOSIS — J34.2 DEVIATED NASAL SEPTUM: ICD-10-CM

## 2024-11-11 DIAGNOSIS — H93.8X3 OTHER SPECIFIED DISORDERS OF EAR, BILATERAL: ICD-10-CM

## 2024-11-11 PROCEDURE — 99213 OFFICE O/P EST LOW 20 MIN: CPT

## 2025-01-20 NOTE — ED ADULT NURSE NOTE - CHIEF COMPLAINT QUOTE
RETURN IN 3 MONTHS WITH AN ECHO    INCREASE AMLODIPINE BACK TO 10 MG DAILY     CONTINUE ALL OTHER MEDICATION    CALL FOR ANY QUESTIONS  
LLQ abd pain x3 days. hx of diverticulitis

## 2025-05-02 ENCOUNTER — NON-APPOINTMENT (OUTPATIENT)
Age: 60
End: 2025-05-02

## 2025-05-05 ENCOUNTER — NON-APPOINTMENT (OUTPATIENT)
Age: 60
End: 2025-05-05

## 2025-05-07 ENCOUNTER — APPOINTMENT (OUTPATIENT)
Dept: OTOLARYNGOLOGY | Facility: CLINIC | Age: 60
End: 2025-05-07
Payer: COMMERCIAL

## 2025-05-07 VITALS
BODY MASS INDEX: 30.48 KG/M2 | HEIGHT: 72 IN | WEIGHT: 225 LBS | SYSTOLIC BLOOD PRESSURE: 131 MMHG | HEART RATE: 81 BPM | DIASTOLIC BLOOD PRESSURE: 76 MMHG

## 2025-05-07 DIAGNOSIS — J34.2 DEVIATED NASAL SEPTUM: ICD-10-CM

## 2025-05-07 DIAGNOSIS — H61.23 IMPACTED CERUMEN, BILATERAL: ICD-10-CM

## 2025-05-07 DIAGNOSIS — J01.90 ACUTE SINUSITIS, UNSPECIFIED: ICD-10-CM

## 2025-05-07 DIAGNOSIS — H60.60 UNSPECIFIED CHRONIC OTITIS EXTERNA, UNSPECIFIED EAR: ICD-10-CM

## 2025-05-07 DIAGNOSIS — H93.8X3 OTHER SPECIFIED DISORDERS OF EAR, BILATERAL: ICD-10-CM

## 2025-05-07 PROCEDURE — 69210 REMOVE IMPACTED EAR WAX UNI: CPT

## 2025-05-07 PROCEDURE — 99214 OFFICE O/P EST MOD 30 MIN: CPT | Mod: 25

## 2025-05-07 RX ORDER — AMOXICILLIN AND CLAVULANATE POTASSIUM 875; 125 MG/1; MG/1
875-125 TABLET, COATED ORAL
Qty: 20 | Refills: 0 | Status: ACTIVE | COMMUNITY
Start: 2025-05-07 | End: 1900-01-01

## 2025-05-07 RX ORDER — METHYLPREDNISOLONE 4 MG/1
4 TABLET ORAL
Qty: 1 | Refills: 0 | Status: ACTIVE | COMMUNITY
Start: 2025-05-07 | End: 1900-01-01

## 2025-08-27 ENCOUNTER — APPOINTMENT (OUTPATIENT)
Dept: OTOLARYNGOLOGY | Facility: CLINIC | Age: 60
End: 2025-08-27
Payer: COMMERCIAL

## 2025-08-27 VITALS
HEART RATE: 67 BPM | SYSTOLIC BLOOD PRESSURE: 161 MMHG | WEIGHT: 230 LBS | HEIGHT: 72 IN | BODY MASS INDEX: 31.15 KG/M2 | DIASTOLIC BLOOD PRESSURE: 93 MMHG

## 2025-08-27 DIAGNOSIS — H93.8X3 OTHER SPECIFIED DISORDERS OF EAR, BILATERAL: ICD-10-CM

## 2025-08-27 DIAGNOSIS — H93.293 OTHER ABNORMAL AUDITORY PERCEPTIONS, BILATERAL: ICD-10-CM

## 2025-08-27 DIAGNOSIS — J34.2 DEVIATED NASAL SEPTUM: ICD-10-CM

## 2025-08-27 DIAGNOSIS — H61.23 IMPACTED CERUMEN, BILATERAL: ICD-10-CM

## 2025-08-27 PROCEDURE — 69210 REMOVE IMPACTED EAR WAX UNI: CPT | Mod: LT,59

## 2025-08-27 PROCEDURE — 99213 OFFICE O/P EST LOW 20 MIN: CPT | Mod: 25
